# Patient Record
Sex: FEMALE | Race: WHITE | NOT HISPANIC OR LATINO | Employment: OTHER | URBAN - METROPOLITAN AREA
[De-identification: names, ages, dates, MRNs, and addresses within clinical notes are randomized per-mention and may not be internally consistent; named-entity substitution may affect disease eponyms.]

---

## 2021-04-19 ENCOUNTER — NURSING HOME VISIT (OUTPATIENT)
Dept: GERIATRICS | Facility: OTHER | Age: 86
End: 2021-04-19
Payer: COMMERCIAL

## 2021-04-19 DIAGNOSIS — E11.10 DIABETIC KETOACIDOSIS WITHOUT COMA ASSOCIATED WITH TYPE 2 DIABETES MELLITUS (HCC): Primary | ICD-10-CM

## 2021-04-19 PROCEDURE — 99306 1ST NF CARE HIGH MDM 50: CPT | Performed by: INTERNAL MEDICINE

## 2021-04-20 VITALS
RESPIRATION RATE: 18 BRPM | SYSTOLIC BLOOD PRESSURE: 145 MMHG | HEART RATE: 73 BPM | WEIGHT: 164.4 LBS | DIASTOLIC BLOOD PRESSURE: 72 MMHG | OXYGEN SATURATION: 95 % | TEMPERATURE: 97.9 F

## 2021-04-20 PROBLEM — E11.10 DIABETIC KETOACIDOSIS WITHOUT COMA ASSOCIATED WITH TYPE 2 DIABETES MELLITUS (HCC): Status: ACTIVE | Noted: 2021-04-20

## 2021-04-21 ENCOUNTER — NURSING HOME VISIT (OUTPATIENT)
Dept: GERIATRICS | Facility: OTHER | Age: 86
End: 2021-04-21
Payer: COMMERCIAL

## 2021-04-21 ENCOUNTER — NURSING HOME VISIT (OUTPATIENT)
Dept: WOUND CARE | Facility: HOSPITAL | Age: 86
End: 2021-04-21
Payer: COMMERCIAL

## 2021-04-21 VITALS
RESPIRATION RATE: 18 BRPM | SYSTOLIC BLOOD PRESSURE: 156 MMHG | DIASTOLIC BLOOD PRESSURE: 84 MMHG | OXYGEN SATURATION: 92 % | TEMPERATURE: 96.5 F | WEIGHT: 164.4 LBS | HEART RATE: 52 BPM

## 2021-04-21 DIAGNOSIS — E11.65 UNCONTROLLED TYPE 2 DIABETES MELLITUS WITH HYPERGLYCEMIA (HCC): ICD-10-CM

## 2021-04-21 DIAGNOSIS — L89.313 PRESSURE INJURY OF RIGHT BUTTOCK, STAGE 3 (HCC): Primary | ICD-10-CM

## 2021-04-21 DIAGNOSIS — U07.1 COVID-19 VIRUS INFECTION: ICD-10-CM

## 2021-04-21 DIAGNOSIS — R09.02 HYPOXIA: Primary | ICD-10-CM

## 2021-04-21 PROCEDURE — 99309 SBSQ NF CARE MODERATE MDM 30: CPT | Performed by: INTERNAL MEDICINE

## 2021-04-21 PROCEDURE — 99305 1ST NF CARE MODERATE MDM 35: CPT | Performed by: NURSE PRACTITIONER

## 2021-04-21 NOTE — ASSESSMENT & PLAN NOTE
- Right buttock  - improved, 100% granulation  - on low air loss mattress  - Clinical factor that will affect wound healing is uncontrolled diabetes , COVID and overactive bladder   Will weekly monitor patient for any deep tissue injury in the buttocks and extremities that can occur in patients with COVID

## 2021-04-21 NOTE — PATIENT INSTRUCTIONS
Orders Placed This Encounter   Procedures    Wound cleansing and dressings     Wound:  Gluteal cleft  Discontinue previous wound order  Cleanse the buttocks with soap and water, pat dry  Apply zinc oxide to wound bed and buttocks  Frequency : twice a day and prn for soiling  Offload all wounds  Turn and reposition frequently, maximum of every two hours  Increase protein intake   Monitor for any sign of infection or worsening, inform PCP or patient's primary physician in your facility immediately       Standing Status:   Future     Standing Expiration Date:   4/21/2022

## 2021-04-21 NOTE — PROGRESS NOTES
[facility]  [address] [place] [address] 78, [address] [address]    Nursing Home Admission    NAME: Francisco Zavaleta  AGE: 80 y o  SEX: female [id number]      Patient Location     [facility]    Patients care was coordinated with nursing facility staff  Recent vitals, labs and updated medications were reviewed on [facility] system of facility  Past Medical, surgical, social, medication and allergy history and patients previous records reviewed  Assessment/Plan:    Diabetic ketoacidosis without coma associated with type 2 diabetes mellitus ([facility] [address] )  patient was recently hospitalized with generalized weakness  Workup revealed blood sugar to be greater than 700  Patient was  Diagnosed with DKA and treated appropriately  Hemoglobin A1c was 14 1   glycemic control later improved with insulin  Continue to monitor blood sugars continue current insulin regimen     Diabetes mellitus type 2 uncontrolled:   Recent hemoglobin A1c was uncontrolled at 14 1  Patient was noted to have blood sugar of 721 upon admission to the hospital   She was treated per DKA protocol  Blood sugar readings have improved  Fasting blood sugar was 157 earlier  Continue  Basaglar 20 units in the morning  Patient additionally remains on Tradjenta 5 mg daily  Continue to monitor blood sugars     Hyponatremia:   sodium level was noted to be low recently at the hospital at around 127  suspect pseudohyponatremia from hyperglycemia  Follow-up repeat BMP     depression:   continue Lexapro and trazodone     vitamin B12 deficiency:   continue  Vitamin B12 supplements     hyperlipidemia:   continue atorvastatin and Ezetimibe     COVID-19 infection:   patient tested positive for COVID-19 prior to discharge from the hospital   Remains asymptomatic with respect to above  Continue vitamin-C and vitamin-D supplements    Monitor respiratory status closely     hypertension   blood pressure stable on metoprolol     E coli bacteremia:   noted recently at the hospital, suspected to be from urinary source  Patient recently completed antibiotic course  Recent ID consultation notes were reviewed    Chief Complaint       Diabetic ketoacidosis, hyponatremia, hypertension, CAD, sepsis, REINIER, ambulatory dysfunction, COVID-19      HPI     Patient is a 80 y o  female with past medical history significant for hypertension, hyperlipidemia,  Meningioma, diabetes mellitus type 2, generalized anxiety disorder, GERD, CKD  CAD status post CABG and anemia  Patient was  Recently hospitalized with generalized weakness and emesis  Patient was noted to be hypertensive and tachycardic in ER  Workup revealed leukocytosis with blood sugar of 721 and serum creatinine of 1 97  Sodium level was low at 128  Initial proBNP was elevated at 25,778  Hemoglobin A1c was high at 14 1  Initial ABGs revealed pH of 7 30 and pCO2 of 35  Patient was diagnosed with DKA, started on insulin drip  Potassium was supplemented  Patient needed short-term ICU stay  She was additionally treated for E coli bacteremia suspected to be from urinary source  Ultrasound kidney was negative for hydronephrosis  Patient tested positive on routine screening prior to discharge  She remained asymptomatic with respect to above  therefore remdesivir and dexamethasone treatment was withheld  Patient was subsequently transferred to [facility] rehab where she is being seen for post hospital admission  At the time of my evaluation patient is doing okay   Denies any active complaints       Past medical history   anemia  CAD status post CABG  CKD   Anxiety  Diabetes mellitus type 2   GERD   Hypertension   IBS   Left bundle branch block   Myocardial infarction  Osteoarthritis   Osteoporosis   Hiatal hernia  History of TMJ disorder         past surgical history    Status post CABG   appendectomy  Bladder suspension   Breast surgery status lumpectomy  Cardiac catheterization with stent prior to CABG  Cholecystectomy   Colonoscopy   Cystourethroscopy   Hemorrhoidectomy   Joint replacement   Conversion of left hip hemiarthroplasty to total hip arthroplasty   Repair of rectocele  Due to later cuff repair  Tonsillectomy  Total abdominal hysterectomy  Total knee arthroplasty     Family history   history of diabetes, heart disease in mother  History of hyperlipidemia and heart disease in father     allergies  ACE inhibitors  Arb angiotensin receptor antagonist   Codeine  Hydrocodone acetaminophen   Lisinopril  Self of  Propoxyphene     Medications:  Updated list was reviewed in [facility] of facility  aspirin 81 mg daily  Ezetimibe  10 mg daily   Albuterol inhaler p r n  Metoprolol 50 mg twice a day   Vitamin-C 1000 mg daily   Vitamin-D 25 mcg daily     Cyanocobalamin  1000 mcg daily  Calcium and vitamin-D daily   Folic acid 078 mcg daily   Trazodone 50 mg daily   Basaglar 20 units daily   Atorvastatin 20 mg daily   Prevacid 30 mg daily   Tradjenta 5 mg daily   Lexapro 5 mg daily     social history:   there is no history of any tobacco or alcohol abuse        Vitals:    04/20/21 2217   BP: 145/72   Pulse: 73   Resp: 18   Temp: 97 9 °F (36 6 °C)   SpO2: 95%       Review of Systems   Constitutional: Positive for fatigue  Negative for chills and fever  HENT: Negative for nosebleeds and rhinorrhea  Eyes: Negative for discharge and redness  Respiratory: Negative for cough, chest tightness, shortness of breath, wheezing and stridor  Cardiovascular: Negative for chest pain and leg swelling  Gastrointestinal: Negative for abdominal distention, abdominal pain, diarrhea and vomiting  Genitourinary: Negative for dysuria, flank pain and hematuria  Musculoskeletal: Positive for gait problem  Negative for arthralgias and back pain  Skin: Negative for pallor  Neurological: Positive for weakness (Generalized)  Negative for tremors, seizures, syncope and headaches  Psychiatric/Behavioral: Negative for agitation, behavioral problems and confusion  Short-term memory is impaired  Physical Exam  Constitutional:       General: She is not in acute distress  Appearance: She is well-developed  She is not diaphoretic  HENT:      Head: Normocephalic and atraumatic  Nose: No rhinorrhea  Eyes:      General:         Right eye: No discharge  Left eye: No discharge  Neck:      Musculoskeletal: Neck supple  Pulmonary:      Effort: No respiratory distress  Breath sounds: No stridor  No wheezing  Abdominal:      General: There is no distension  Musculoskeletal:      Right lower leg: No edema  Left lower leg: No edema  Skin:     Coloration: Skin is not jaundiced or pale  Neurological:      General: No focal deficit present  Mental Status: She is alert  Cranial Nerves: No cranial nerve deficit  Comments: Patient is alert and arousable,   Psychiatric:         Mood and Affect: Mood normal          Behavior: Behavior normal       cardiopulmonary exam was not done due to COVID positive status      Diagnostic Data       Recent labs were reviewed  hemoglobin A1c of 14 1  sodium level 133, potassium 4 6,  BUN 20, creatinine 1 17, magnesium 1 9, WBC count 12 8, hemoglobin 10 3, platelet count 114  Chest x-ray was negative for any acute cardiopulmonary pathology   CT head revealed calcified meningioma with progression of volume loss with white matter disease ultrasound kidneys was negative for hydronephrosis      Additional notes:      continue current treatment   Monitor respiratory status closely  Follow blood sugars and adjust insulin dose accordingly   Follow-up repeat labs  Continue PT OT     All documentation pertaining to patient's past medical surgical social medical medication and allergy history was documented personally    Total time spent on history and physical was in excess of 45 minutes      This note was electronically signed by Dr Bong Lopez

## 2021-04-21 NOTE — PROGRESS NOTES
[facility]  [address]   [address] [place] [address], Brendon Mcclellan U  49     Progress Note  Code  SNF 31    Patient Location      [facility] rehab    Reason for visit      follow-up COVID-19, transient episode of dyspnea with hypoxia   Diabetes mellitus type 2 uncontrolled   Pressure injury of right buttock, recent E coli bacteremia/ UTI, hypertension    Patients care was coordinated with nursing facility staff  Recent vitals, labs and updated medications were reviewed on [facility] of facility  Past Medical, surgical, social, medication and allergy history and patients previous records reviewed  Problem List Items Addressed This Visit        Respiratory    Hypoxia - Primary      Patient was noted to have transient episode of dyspnea with hypoxia yesterday in the setting of COVID-19  Oxygen saturation was borderline low at 92%  Patient was started on oxygen prn, currently saturating at 95%  Patient tested positive for COVID-19 prior to return from the hospital  On routine screening  She remains afebrile  Respiratory status is currently stable  Continue vitamin-C ,  Vitamin-D and  atorvastatin  Add  zinc 50 mg daily  Continue to monitor respiratory status closely  Check chest x-ray WBC count was normal at 4 0 yesterday platelet count 303  will check inflammatory markers including D-dimer  Start Eliquis if D-dimer is greater than 2 5             Diabetic ketoacidosis without coma associated with type 2 diabetes mellitus ([facility] [address] )   patient was recently hospitalized with DKA  Hemoglobin A1c was 14 1   glycemic control later improved with insulin  Continue to monitor blood sugars   On current insulin regimen      Diabetes mellitus type 2 uncontrolled:   Recent hemoglobin A1c was uncontrolled at 14 1  Patient was noted to have blood sugar of 721 upon admission to the hospital   She was treated per DKA protocol    Blood sugar readings have significantly improved  Fasting blood sugar was  112 earlier  Continue  Basaglar 20 units in the morning  Patient additionally remains on Tradjenta 5 mg daily  Continue to monitor blood sugars  Per staff patient's p o  intake is down  Will decrease insulin if blood sugars show a downward trend     Hyponatremia:   sodium level was noted to be low recently at the hospital at around 127  suspect pseudohyponatremia from hyperglycemia  BMP from yesterday reveals sodium to be normal at 136     depression:   continue Lexapro and trazodone      vitamin B12 deficiency:   continue  Vitamin B12 supplements      hyperlipidemia:   continue atorvastatin and Ezetimibe      COVID-19 infection:   patient tested positive for COVID-19 prior to discharge from the hospital   Remains asymptomatic with respect to above except had 1 episode of low SaO2 last night  Continue vitamin-C and vitamin-D supplements  Monitor respiratory status closely  check CRP, ferritin, D-dimer with next blood draw       hypertension   blood pressure stable on metoprolol      E coli bacteremia:   noted recently at the hospital, suspected to be from urinary source  Patient recently completed antibiotic course  Recent ID consultation notes were reviewed      HPI        patient is being seen for a follow-up visit today  She was recently transferred to SNF following hospitalization for DKA and uncontrolled diabetes  Patient tested positive for COVID prior to discharge on routine screening  She was asymptomatic earlier however  had a transient episode of hypoxia yesterday  Patient was started on oxygen with subsequent improvement in respiratory status  She is currently saturating at 92% on 2 liters  There have been no reports of any fever chills or chest congestion  Patient is noted to be pleasantly confused  Blood sugars are well controlled  Per staff patient's p o  intake is on the lower side  Review of Systems   Constitutional: Positive for fatigue  Negative for chills and fever  HENT: Negative for nosebleeds and rhinorrhea  Eyes: Negative for discharge and redness  Respiratory: Positive for shortness of breath (SaO2 noted to be low in 80s yesterday, improved with oxygen)  Negative for cough, wheezing and stridor  Cardiovascular: Negative for chest pain and leg swelling  Gastrointestinal: Negative for abdominal distention, abdominal pain, diarrhea and vomiting  Endocrine:        Blood sugars high recently at the hospital, well controlled at present   Genitourinary: Negative for hematuria  Musculoskeletal: Positive for gait problem  Negative for arthralgias and back pain  Skin: Negative for pallor  Neurological: Positive for weakness (Generalized)  Negative for tremors, seizures and syncope  Psychiatric/Behavioral: Positive for confusion  Negative for agitation and behavioral problems  medications    Updated list was reviewed in [facility] system of facility  Vitals:    04/21/21 1517   BP: 156/84   Pulse: (!) 52   Resp: 18   Temp: (!) 96 5 °F (35 8 °C)   SpO2: 92%       Physical Exam  Constitutional:       General: She is not in acute distress  Appearance: She is well-developed  She is not diaphoretic  HENT:      Head: Normocephalic and atraumatic  Nose: No rhinorrhea  Eyes:      General: No scleral icterus  Right eye: No discharge  Left eye: No discharge  Neck:      Musculoskeletal: Neck supple  Cardiovascular:      Rate and Rhythm: Normal rate and regular rhythm  Pulmonary:      Effort: No respiratory distress  Breath sounds: No stridor  No wheezing  Abdominal:      General: There is no distension  Palpations: Abdomen is soft  Tenderness: There is no abdominal tenderness  Skin:     Coloration: Skin is not pale  Neurological:      Mental Status: She is alert  Diagnostic Data:    Recent labs were reviewed     labs done on 04/20/2021 revealed BUN of 30, creatinine 0 97, sodium 136, potassium 4 4, hemoglobin 10 6, WBC count 4, platelet count 456    Additional Notes:    check CBC, D-dimer, CRP, ferritin  Check chest x-ray   Add zinc 220 mg daily    Follow-up blood sugars   care coordinated with patient's daughter over the phone  This note was electronically signed by Dr Dea Rizo

## 2021-04-21 NOTE — ASSESSMENT & PLAN NOTE
patient was recently hospitalized with generalized weakness  Workup revealed blood sugar to be greater than 700  Patient was  Diagnosed with DKA and treated appropriately  Hemoglobin A1c was 14 1   glycemic control later improved with insulin    Continue to monitor blood sugars continue current insulin regimen

## 2021-04-21 NOTE — ASSESSMENT & PLAN NOTE
Patient was noted to have transient episode of dyspnea with hypoxia yesterday in the setting of COVID-19  Oxygen saturation was borderline low at 92%  Patient was started on oxygen prn, currently saturating at 95%  Patient tested positive for COVID-19 prior to return from the hospital  On routine screening  She remains afebrile  Respiratory status is currently stable  Continue vitamin-C ,  Vitamin-D and  atorvastatin  Add  zinc 50 mg daily  Continue to monitor respiratory status closely  Check chest x-ray WBC count was normal at 4 0 yesterday platelet count 061  will check inflammatory markers including D-dimer    Start Eliquis if D-dimer is greater than 2 5

## 2021-04-21 NOTE — PROGRESS NOTES
[address] MANAGEMENT   AND HYPERBARIC MEDICINE CENTER       Patient ID: Mai Hayes is a 80 y o  female Date of Birth 1934     Location of Service: [facility]    Performed wound round with: DON and nurse manager      Chief Complaint   Patient presents with   Zach Andrews New Patient Visit     Glutal cleft        Wound Instructions:  Orders Placed This Encounter   Procedures    Wound cleansing and dressings     Wound:  Gluteal cleft  Discontinue previous wound order  Cleanse the buttocks with soap and water, pat dry  Apply zinc oxide to wound bed and buttocks  Frequency : twice a day and prn for soiling  Offload all wounds  Turn and reposition frequently, maximum of every two hours  Increase protein intake   Monitor for any sign of infection or worsening, inform PCP or patient's primary physician in your facility immediately  Standing Status:   Future     Standing Expiration Date:   4/21/2022       Allergies  Reconcile with Patient's Chart  Active Allergy Reactions Severity Noted Date Comments   Ace Inhibitors Hives   04/19/2019     Arb-Angiotensin Receptor Antagonist     04/19/2019     Codeine Nausea and/or vomiting   10/07/2005 "super sleepy"     Hydrocodone-Acetaminophen Nausea and/or vomiting         Lisinopril Hives   10/04/2016     Prednisone Other (See Comments)   04/27/2015 "too powerful"     Propoxyphene Hcl Other (See Comments)   10/07/2005 increased sleepiness     Sulfa (Sulfonamide Antibiotics) Rash, Hives            Assessment & Plan:  1  Pressure injury of right buttock, stage 3 (formerly [facility])  Assessment & Plan:  - Right buttock  - improved, 100% granulation  - on low air loss mattress  - Clinical factor that will affect wound healing is uncontrolled diabetes , COVID and overactive bladder  Will weekly monitor patient for any deep tissue injury in the buttocks and extremities that can occur in patients with COVID    Orders:  -     Wound cleansing and dressings; Future    2   COVID-19 virus infection  Assessment & Plan:  - (+) cough  - on oxygen via nasal cannula  - no SOB during visit  - manage by [facility]      3  Uncontrolled type 2 diabetes mellitus with hyperglycemia [facility])  Assessment & Plan:     Patient was recently hospitalized for DKA  - Currently managed by [facility]               Subjective: This is a 80year old female referred to our service for wound on the right gluteal cleft  Patient was recently hospitalized for DKA and was discharged to [facility] for short term rehab  As per report, the patient had the wound prior to admission to [facility]  Etiology : as per medical record, unstageable pressure ulcer  Severity : no sign of infection  Current treatment : [facility]  Patient's care was coordinated with nursing facility staff  Recent vitals, labs and updated medications were reviewed on EMR or chart system of facility   Past Medical, surgical, social, medication and allergy history and patient's previous records were reviewed and updated as appropriate:     Medical History    Medical History Date Comments   DM (diabetes mellitus), type 2 ([address] )       Mixed hyperlipidemia       Hypertension       CAD (coronary artery disease)   s/p CABG   LBBB (left bundle branch block)       GE reflux       Hiatal hernia   small   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    Diverticulosis       Osteoarthritis   of the spine and weight bearing joints   Lower back pain       Osteopenia       Right radial head fracture 2013     IBS (irritable bowel syndrome)       History of TMJ disorder       Hearing loss   High frequency    Hemorrhoid       Urinary frequency       Incontinent of urine       Anxiety       Meningioma (HCC)       Right wrist fracture 11/2013 secondary to fall    Urinary retention   Enterobacter UTI 6/2014   Anemia 06/2014     Other malaise and fatigue       CKD (chronic kidney disease) stage 3, GFR 30-59 ml/min       Myocardial infarction [facility])       OAB (overactive bladder)           Surgical History    Surgery Date Site/Laterality Comments   TOTAL KNEE ARTHROPLASTY 2014 Right      APPENDECTOMY          CORONARY ARTERY BYPASS GRAFT 2008 - 2008   x3 LIMA sequentially to LAD and diagonal SVG to RCA      TOTAL ABDOMINAL HYSTERECTOMY          BILATERAL SALPINGOOPHORECTOMY          CHOLECYSTECTOMY          CYSTOCELE REPAIR     X2     HEMORROIDECTOMY          BLADDER SUSPENSION          ESOPHAGOGASTRODUODENOSCOPY          COLONOSCOPY 2015   Dr Han Sheridan right 2015 Bilateral Dr Richelle Villagomez 2015 Hip/Left Procedure: Left hip hemiarthroplasty; Surgeon:  Shane Sanchez MD; Location:  OR; Service: Orthopedics    Medical devices from this surgery are in the Implants section      TONSILLECTOMY          REPAIR RECTOCELE 2018   site specific     CYSTOURETHROSCOPY 2018        INCONTINENCE SURGERY 2018   botox injection for chemodenervation of the bladder     JOINT REPLACEMENT 2016   Conversion of left hip hemiarthroplasty to total hip arthroplasty; Dr Janelle Palumbo with stents prior to CABG     CYSTOURETHROSCOPY 2019        INCONTINENCE SURGERY 2019   injection of bulking agent Botox A injectin         Family History    Medical History Relation Name Comments   Diabetes Father       Heart disease Father       Hyperlipidemia Father       Diabetes Mother       Heart disease Mother       Hyperlipidemia Mother         Relation Name Status Comments   Brother        Brother        Father         Mother         Sister           Social History    Tobacco Use Types Packs/Day Years Used Date   Never Smoker   0       Smokeless Tobacco: Never Used           Tobacco Cessation: Counseling Given: No     Alcohol Use Standard Drinks/Week Comments   Not Currently 0 (1 standard drink = 0 6 oz pure alcohol) very rare     Sex Assigned at Birth Date Recorded       Review of Systems   Constitutional: Negative  HENT: Negative  Eyes: Negative  Respiratory: Negative  Gastrointestinal: Negative  Endocrine: Negative  Genitourinary: Positive for urgency  Incontinence   Musculoskeletal: Positive for gait problem  Skin: Positive for wound  See HPI   Neurological: Negative for dizziness and headaches  Psychiatric/Behavioral: Negative for behavioral problems  Objective: There were no vitals taken for this visit  Physical Exam  Constitutional:       Appearance: Normal appearance  HENT:      Head: Normocephalic and atraumatic  Nose: Nose normal    Eyes:      Pupils: Pupils are equal, round, and reactive to light  Neck:      Musculoskeletal: Normal range of motion  Cardiovascular:      Rate and Rhythm: Normal rate  Pulmonary:      Effort: Pulmonary effort is normal       Comments: With oxygen supplement with nasal cannula  Abdominal:      Palpations: Abdomen is soft  Tenderness: There is no abdominal tenderness  There is no guarding  Musculoskeletal:         General: No tenderness  Right lower leg: No edema  Left lower leg: No edema  Comments: LROM   Skin:     General: Skin is warm  Findings: Lesion present  Comments: Location Right gluteal cleft wound bed - 0 2 x 0 2 x 0 1 cm , no undermining, no tunneling, 0 % epithelial, 100%granulation, 0%slough, exudate -no drainage, no malodor ( assess after dressing removal and cleansing), wound edge - attached to base, periwound - intact  No localized sign of infection, Denies pain     Neurological:      Mental Status: She is alert and oriented to person, place, and time        Gait: Gait abnormal    Psychiatric:         Mood and Affect: Mood normal          Behavior: Behavior normal               Procedures             Coordination of Care: Nurse Manager aware of the treatment plan Follow up :  Return in about 1 week (around 4/28/2021)        IRAM Hall

## 2021-04-28 ENCOUNTER — NURSING HOME VISIT (OUTPATIENT)
Dept: WOUND CARE | Facility: HOSPITAL | Age: 86
End: 2021-04-28
Payer: COMMERCIAL

## 2021-04-28 DIAGNOSIS — U07.1 COVID-19 VIRUS INFECTION: ICD-10-CM

## 2021-04-28 DIAGNOSIS — L89.313 PRESSURE INJURY OF RIGHT BUTTOCK, STAGE 3 (HCC): Primary | ICD-10-CM

## 2021-04-28 PROCEDURE — 99307 SBSQ NF CARE SF MDM 10: CPT | Performed by: NURSE PRACTITIONER

## 2021-04-28 NOTE — PROGRESS NOTES
[address] [facility]NAG[facility]   [facility]       Patient ID: Luigi Lombard is a 80 y o  female Date of Birth 1934     Location of Service: [facility]    Performed wound round with: Alpesh Banuelos and nurse manager      Chief Complaint   Patient presents with    Follow Up Wound Care Visit     antonina cleft       Wound Instructions:  Orders Placed This Encounter   Procedures    Wound cleansing and dressings     Wound:   cleft  Discontinue previous wound order  Cleanse the buttocks with soap and water, pat dry  Apply zinc oxide to wound bed and buttocks  Frequency : daily and prn for soiling  Offload all wounds  Turn and reposition frequently, maximum of every two hours  Increase protein intake   Monitor for any sign of infection or worsening, inform PCP or patient's primary physician in your facility immediately  Standing Status:   Future     Standing Expiration Date:   2022       Allergies  Reconcile with Patient's Chart  Active Allergy Reactions Severity Noted Date Comments   Ace Inhibitors Hives   2019     Arb-Angiotensin Receptor Antagonist     2019     Codeine Nausea and/or vomiting   10/07/2005 "super sleepy"     Hydrocodone-Acetaminophen Nausea and/or vomiting         Lisinopril Hives   10/04/2016     Prednisone Other (See Comments)   2015 "too powerful"     Propoxyphene Hcl Other (See Comments)   10/07/2005 increased sleepiness     Sulfa (Sulfonamide Antibiotics) Rash, Hives            Assessment & Plan:  1  Pressure injury of right buttock, stage 3 ([facility])  Assessment & Plan:  -  cleft  - resolved  - continue zinc oxide for protection  - sign off    Orders:  -     Wound cleansing and dressings; Future    2  COVID-19 virus infection  Assessment & Plan:  - stable as per medical record review  - manage by Senior care    Orders:  -     Wound cleansing and dressings;  Future           Subjective:   2021 This is a follow up of wound on the antonina cleft  Received patient in bed, denies pain  No significant issues related to the wound  Interval history  2021 This is a 80year old female referred to our service for wound on the right gluteal cleft  Patient was recently hospitalized for DKA and was discharged to [facility] for short term rehab  As per report, the patient had the wound prior to admission to [facility]  Etiology : as per medical record, unstageable pressure ulcer  Severity : no sign of infection  Current treatment : [facility]  Patient's care was coordinated with nursing facility staff  Recent vitals, labs and updated medications were reviewed on EMR or chart system of facility   Past Medical, surgical, social, medication and allergy history and patient's previous records were reviewed and updated as appropriate:     Medical History    Medical History Date Comments   DM (diabetes mellitus), type 2 ([facility] [address] )       Mixed hyperlipidemia       Hypertension       CAD (coronary artery disease)   s/p CABG   LBBB (left bundle branch block)       GE reflux       Hiatal hernia   small   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    Diverticulosis       Osteoarthritis   of the spine and weight bearing joints   Lower back pain       Osteopenia       Right radial head fracture      IBS (irritable bowel syndrome)       History of TMJ disorder       Hearing loss   High frequency    Hemorrhoid       Urinary frequency       Incontinent of urine       Anxiety       Meningioma (HCC)       Right wrist fracture 2013 secondary to fall    Urinary retention   Enterobacter UTI 2014   Anemia 2014     Other malaise and fatigue       CKD (chronic kidney disease) stage 3, GFR 30-59 ml/min       Myocardial infarction [facility])       OAB (overactive bladder) 2019          Surgical History    Surgery Date Site/Laterality Comments   TOTAL KNEE ARTHROPLASTY 2014 Right      APPENDECTOMY          CORONARY ARTERY BYPASS GRAFT 2008 - 2008   x3 LIMA sequentially to LAD and diagonal SVG to RCA      TOTAL ABDOMINAL HYSTERECTOMY          BILATERAL SALPINGOOPHORECTOMY          CHOLECYSTECTOMY          CYSTOCELE REPAIR     X2     HEMORROIDECTOMY          BLADDER SUSPENSION          ESOPHAGOGASTRODUODENOSCOPY          COLONOSCOPY 2015   Dr Bruce Saenz right 2015 Bilateral Dr Jaskaran Rivera 2015 Hip/Left Procedure: Left hip hemiarthroplasty; Surgeon: Erickson Santillan MD; Location:  OR; Service: Orthopedics    Medical devices from this surgery are in the Implants section      TONSILLECTOMY          REPAIR RECTOCELE 2018   site specific     CYSTOURETHROSCOPY 2018        INCONTINENCE SURGERY 2018   botox injection for chemodenervation of the bladder     JOINT REPLACEMENT 2016   Conversion of left hip hemiarthroplasty to total hip arthroplasty; Dr Paul Flores with stents prior to CABG     CYSTOURETHROSCOPY 2019        INCONTINENCE SURGERY 2019   injection of bulking agent Botox A injectin         Family History    Medical History Relation Name Comments   Diabetes Father       Heart disease Father       Hyperlipidemia Father       Diabetes Mother       Heart disease Mother       Hyperlipidemia Mother         Relation Name Status Comments   Brother        Brother        Father         Mother         Sister           Social History    Tobacco Use Types Packs/Day Years Used Date   Never Smoker   0       Smokeless Tobacco: Never Used           Tobacco Cessation: Counseling Given: No     Alcohol Use Standard Drinks/Week Comments   Not Currently 0 (1 standard drink = 0 6 oz pure alcohol) very rare     Sex Assigned at Birth Date Recorded       Review of Systems   Constitutional: Negative  HENT: Negative  Eyes: Negative  Respiratory: Negative      Gastrointestinal: Negative  Endocrine: Negative  Genitourinary: Positive for urgency  Incontinence   Musculoskeletal: Positive for gait problem  Skin: Positive for wound  See HPI   Neurological: Negative for dizziness and headaches  Psychiatric/Behavioral: Negative for behavioral problems  Objective: There were no vitals taken for this visit  Physical Exam  Constitutional:       Appearance: Normal appearance  HENT:      Head: Normocephalic and atraumatic  Nose: Nose normal    Eyes:      Pupils: Pupils are equal, round, and reactive to light  Neck:      Musculoskeletal: Normal range of motion  Pulmonary:      Effort: Pulmonary effort is normal       Comments: With oxygen supplement with nasal cannula  Abdominal:      Palpations: Abdomen is soft  Tenderness: There is no abdominal tenderness  There is no guarding  Musculoskeletal:         General: No tenderness  Right lower leg: No edema  Left lower leg: No edema  Comments: LROM   Skin:     General: Skin is warm  Findings: Lesion present  Comments: Location Right  cleft wound bed - healed   Neurological:      Mental Status: She is alert and oriented to person, place, and time  Gait: Gait abnormal    Psychiatric:         Mood and Affect: Mood normal          Behavior: Behavior normal               Procedures               Coordination of Care: Nurse Manager aware of the treatment plan     Follow up :  Return if symptoms worsen or fail to improve        IRAM Beckford